# Patient Record
Sex: MALE | Race: ASIAN
[De-identification: names, ages, dates, MRNs, and addresses within clinical notes are randomized per-mention and may not be internally consistent; named-entity substitution may affect disease eponyms.]

---

## 2021-08-25 ENCOUNTER — HOSPITAL ENCOUNTER (EMERGENCY)
Dept: HOSPITAL 4 - SED | Age: 61
Discharge: HOME | End: 2021-08-25
Payer: MEDICAID

## 2021-08-25 VITALS — BODY MASS INDEX: 21.19 KG/M2 | HEIGHT: 67 IN | WEIGHT: 135 LBS

## 2021-08-25 VITALS — SYSTOLIC BLOOD PRESSURE: 118 MMHG

## 2021-08-25 DIAGNOSIS — R07.89: Primary | ICD-10-CM
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
## 2021-08-25 PROCEDURE — 71250 CT THORAX DX C-: CPT

## 2021-08-25 PROCEDURE — 96372 THER/PROPH/DIAG INJ SC/IM: CPT

## 2021-08-25 PROCEDURE — 76376 3D RENDER W/INTRP POSTPROCES: CPT

## 2021-08-25 PROCEDURE — 99284 EMERGENCY DEPT VISIT MOD MDM: CPT

## 2021-08-25 NOTE — NUR
Patient given written and verbal discharge instructions and verbalizes 
understanding.  ER MD discussed with patient the results and treatment 
provided. Patient in stable condition. ID arm band removed. 

Rx of Ibuprofen and Norco   given. Patient educated on pain management and to 
follow up with PMD. Pain Scale 3/10 tolerable for pt .

Opportunity for questions provided and answered. Medication side effect fact 
sheet provided. [Nl] : Integumentary [FreeTextEntry2] : pelvic discomfort

## 2021-08-25 NOTE — NUR
Pt brought by self , A&Ox4, pt presents with L rib pain after he fell few days 
ago, skin pink and warm, cap refill <3, VSS, respirations even and unlabored